# Patient Record
Sex: FEMALE | Race: WHITE | URBAN - METROPOLITAN AREA
[De-identification: names, ages, dates, MRNs, and addresses within clinical notes are randomized per-mention and may not be internally consistent; named-entity substitution may affect disease eponyms.]

---

## 2022-06-30 ENCOUNTER — OFFICE VISIT (OUTPATIENT)
Dept: NEPHROLOGY | Facility: CLINIC | Age: 67
End: 2022-06-30
Payer: COMMERCIAL

## 2022-06-30 VITALS — DIASTOLIC BLOOD PRESSURE: 58 MMHG | WEIGHT: 276.5 LBS | SYSTOLIC BLOOD PRESSURE: 112 MMHG

## 2022-06-30 DIAGNOSIS — N18.9 CHRONIC KIDNEY DISEASE, UNSPECIFIED CKD STAGE: ICD-10-CM

## 2022-06-30 DIAGNOSIS — N17.9 AKI (ACUTE KIDNEY INJURY) (HCC): Primary | ICD-10-CM

## 2022-06-30 RX ORDER — MONTELUKAST SODIUM 10 MG/1
10 TABLET ORAL DAILY
COMMUNITY
Start: 2022-05-11

## 2022-06-30 RX ORDER — OMEPRAZOLE 40 MG/1
40 CAPSULE, DELAYED RELEASE ORAL DAILY
COMMUNITY
Start: 2022-04-15

## 2022-06-30 RX ORDER — SEMAGLUTIDE 1.34 MG/ML
INJECTION, SOLUTION SUBCUTANEOUS
COMMUNITY

## 2022-06-30 RX ORDER — LEVOCETIRIZINE DIHYDROCHLORIDE 5 MG/1
TABLET, FILM COATED ORAL
COMMUNITY

## 2022-06-30 RX ORDER — LISINOPRIL 30 MG/1
TABLET ORAL
COMMUNITY

## 2022-06-30 RX ORDER — SERTRALINE HYDROCHLORIDE 100 MG/1
100 TABLET, FILM COATED ORAL DAILY
COMMUNITY
Start: 2022-05-11

## 2022-06-30 RX ORDER — CHOLESTYRAMINE 4 G/9G
POWDER, FOR SUSPENSION ORAL
COMMUNITY
Start: 2022-05-15

## 2022-07-05 ENCOUNTER — MED REC SCAN ONLY (OUTPATIENT)
Dept: NEPHROLOGY | Facility: CLINIC | Age: 67
End: 2022-07-05

## 2025-01-14 ENCOUNTER — OFFICE VISIT (OUTPATIENT)
Dept: NEPHROLOGY | Facility: CLINIC | Age: 70
End: 2025-01-14
Payer: COMMERCIAL

## 2025-01-14 VITALS — WEIGHT: 252.63 LBS | DIASTOLIC BLOOD PRESSURE: 63 MMHG | SYSTOLIC BLOOD PRESSURE: 112 MMHG

## 2025-01-14 DIAGNOSIS — N18.32 STAGE 3B CHRONIC KIDNEY DISEASE (HCC): Primary | ICD-10-CM

## 2025-01-14 PROCEDURE — 3074F SYST BP LT 130 MM HG: CPT | Performed by: INTERNAL MEDICINE

## 2025-01-14 PROCEDURE — 99215 OFFICE O/P EST HI 40 MIN: CPT | Performed by: INTERNAL MEDICINE

## 2025-01-14 PROCEDURE — 3078F DIAST BP <80 MM HG: CPT | Performed by: INTERNAL MEDICINE

## 2025-01-14 RX ORDER — GLIPIZIDE 5 MG/1
5 TABLET ORAL
COMMUNITY

## 2025-01-14 RX ORDER — PRAVASTATIN SODIUM 10 MG
10 TABLET ORAL NIGHTLY
COMMUNITY

## 2025-01-14 RX ORDER — BUMETANIDE 2 MG/1
2 TABLET ORAL DAILY
COMMUNITY

## 2025-01-14 RX ORDER — CARVEDILOL 6.25 MG/1
6.25 TABLET ORAL 2 TIMES DAILY WITH MEALS
COMMUNITY

## 2025-01-14 RX ORDER — POTASSIUM CITRATE 15 MEQ/1
1 TABLET, EXTENDED RELEASE ORAL DAILY
COMMUNITY

## 2025-01-14 NOTE — PROGRESS NOTES
Nephrology Consult Note      REASON FOR CONSULT:  Recent ELIZABETH         HPI:   Dede Obando is a 69 year old female with   Chief Complaint   Patient presents with    Acute Renal Injury    Other     Elevated Cr      SAM RIOS    65 y/o female with hx of DM, HTN, IBS, CKD; Cr ~ 1.2 in 2022, drepression. Chronic pain -  was on chronic nsaids  until last year (2024) presents for evaluation. She was seen by me in 2022, but did not follow through because of insurance issues. She had established care in [facility] w/ Dr. Sanders/Jaquan.  She reported in hospital ([facility]) in Feb 2024 w/ ELIZABETH- found to have sepsis/obstructive uropathy; requred HD briefly, then switched to PD x ~ 2mos. She required uretral stent/lithotripsy. She eventually recovered and has continued to f/u with Dr. Partida's group- last seein in there office in Nov 2024.  She has f/u planned w/ urology     Currently of acei/arb due to hyperkalemia per patient. Additionally she has tried SGLT2i/GLP-1 with significant side effects in past and hence off.    She takes bumex about 3-4 days out of the week - can't take on certain days due to her job and wanting to avoid taking bathroom breaks  No LE edema  Monitors diet  She is presently on oreg/bumex 2 daily(as above)  Off nsaids; takes tylenol prn  She is on K-citrate- started on by urology        ROS:  See above; 12 systems reviewed and otherwise unremarkable     PMH:  Past Medical History:    Arthritis    Diabetes mellitus (HCC)    IBS (irritable bowel syndrome)    BRITTA (obstructive sleep apnea)         PSH:  Past Surgical History:   Procedure Laterality Date    Carpal tunnel release Bilateral     Cataract extraction extracapsular w/ intraocular lens implantation Left          Medications (Active prior to today's visit):  Current Outpatient Medications   Medication Sig Dispense Refill    carvedilol 6.25 MG Oral Tab Take 1 tablet (6.25 mg total) by mouth 2 (two) times daily with meals.       bumetanide 2 MG Oral Tab Take 1 tablet (2 mg total) by mouth daily.      Potassium Citrate ER 15 MEQ (1620 MG) Oral Tab CR Take 1 tablet by mouth daily.      pravastatin 10 MG Oral Tab Take 1 tablet (10 mg total) by mouth nightly.      fluticasone propionate 50 MCG/ACT Inhalation Aerosol Powder, Breath Activated Inhale 1 puff into the lungs 2 (two) times daily.      glipiZIDE 5 MG Oral Tab Take 1 tablet (5 mg total) by mouth every morning before breakfast.      sertraline 100 MG Oral Tab Take 1 tablet (100 mg total) by mouth daily.      levocetirizine 5 MG Oral Tab Xyzal      montelukast 10 MG Oral Tab Take 1 tablet (10 mg total) by mouth daily.      Semaglutide,0.25 or 0.5MG/DOS, (OZEMPIC, 0.25 OR 0.5 MG/DOSE,) 2 MG/1.5ML Subcutaneous Solution Pen-injector Ozempic 0.25 mg or 0.5 mg (2 mg/1.5 mL) subcutaneous pen injector (Patient not taking: Reported on 1/14/2025)      lisinopril 30 MG Oral Tab lisinopril 30 mg tablet (Patient not taking: Reported on 1/14/2025)      metFORMIN HCl 1000 MG Oral Tab Take 1,000 mg by mouth 2 (two) times daily with meals. (Patient not taking: Reported on 1/14/2025)      Omeprazole 40 MG Oral Capsule Delayed Release Take 40 mg by mouth daily. (Patient not taking: Reported on 1/14/2025)      NAPROXEN  mg. (Patient not taking: Reported on 1/14/2025)      Cholestyramine 4 g Oral Powd Pack MIX THE CONTENTS OF 1 PACKET INTO LIQUID AND DRINK ONCE A DAY AS DIRECTED (Patient not taking: Reported on 1/14/2025)           Allergies:  Allergies   Allergen Reactions    Azithromycin HIVES    Penicillins HIVES       Social History:  Social History     Socioeconomic History    Marital status: Unknown   Tobacco Use    Smoking status: Never    Smokeless tobacco: Never   Substance and Sexual Activity    Alcohol use: Not Currently    Drug use: Never          Family History:  No family history on file.         PHYSICAL EXAM:   /63 (BP Location: Left arm, Patient Position: Sitting, Cuff Size:  large)   Wt 252 lb 9.6 oz (114.6 kg)    Wt Readings from Last 6 Encounters:   01/14/25 252 lb 9.6 oz (114.6 kg)   06/30/22 276 lb 8 oz (125.4 kg)     General: Alert and oriented in no apparent distress.  HEENT: No scleral icterus, MMM  Neck: Supple, no OFREST or thyromegaly  Cardiac: Regular rate and rhythm, S1, S2 normal, no murmur or rub  Lungs: Clear without wheezes, rales, rhonchi.    Abdomen: Soft, non-tender. + bowel sounds, no palpable organomegaly  Extremities: Without clubbing, cyanosis or edema.  Neurologic:  normal affect, cranial nerves grossly intact, moving all extremities  Skin: Warm and dry, no rashes    Labs reviewed with patient    Data from other hospitals reviewed via Care Everywhere     ASSESSMENT/PLAN:   There are no diagnoses linked to this encounter.    1. CKD- chronic kidney disease likely related to DM/HTN +/- heavy chronic nsaid use. As noted in HPI- pt w/ severe ELIZABETH due to sepsis/obstructive uropathy in Feb 2024- required dialysis (initially on HD, but switched to PD due to patient's intolerance of HD).  She fortunately recovered renal function. I have limited data to review and per patient her most recent Cr was ~ 1.6  - check labs and renal US as above  - continue f/u with urology  - advised hydration to make at least 2- 3L of urine daily  - avoid high salt foods  - check A1c  -  patient  wanted to know what can be done to reverse her renal disease and I reviewed pathologic nature of CKD and unlikelihood of reversing  a chronic injury ; mainstay of CKD care is to slow down further progression. Patient has had trial of SGLT2i/GLP with severe side effect that precludes use. Additionally, she had problems w/ hyperkalemia with acei/arb. I would recommended agains kerendia given pt's proclivity towards hyprkalemia w/ acei/arb.     2. DM- recent A1c good; recheck    3. HTN- currently well controlled; as above      F/u after above w/u completed.   Advised pt to continue f/u with our office or  with her prior nephrologist       Jimbo Smith MD  1/14/2025